# Patient Record
Sex: MALE | Race: WHITE
[De-identification: names, ages, dates, MRNs, and addresses within clinical notes are randomized per-mention and may not be internally consistent; named-entity substitution may affect disease eponyms.]

---

## 2018-04-18 ENCOUNTER — HOSPITAL ENCOUNTER (EMERGENCY)
Dept: HOSPITAL 17 - NEPC | Age: 68
Discharge: HOME | End: 2018-04-18
Payer: MEDICARE

## 2018-04-18 VITALS
SYSTOLIC BLOOD PRESSURE: 193 MMHG | TEMPERATURE: 97.9 F | HEART RATE: 60 BPM | OXYGEN SATURATION: 100 % | DIASTOLIC BLOOD PRESSURE: 81 MMHG | RESPIRATION RATE: 18 BRPM

## 2018-04-18 VITALS
DIASTOLIC BLOOD PRESSURE: 81 MMHG | SYSTOLIC BLOOD PRESSURE: 178 MMHG | OXYGEN SATURATION: 99 % | RESPIRATION RATE: 12 BRPM | HEART RATE: 55 BPM

## 2018-04-18 VITALS
RESPIRATION RATE: 15 BRPM | HEART RATE: 78 BPM | DIASTOLIC BLOOD PRESSURE: 82 MMHG | SYSTOLIC BLOOD PRESSURE: 164 MMHG | OXYGEN SATURATION: 98 %

## 2018-04-18 VITALS — WEIGHT: 253.53 LBS | BODY MASS INDEX: 33.6 KG/M2 | HEIGHT: 73 IN

## 2018-04-18 DIAGNOSIS — L08.9: ICD-10-CM

## 2018-04-18 DIAGNOSIS — H92.02: Primary | ICD-10-CM

## 2018-04-18 DIAGNOSIS — M54.2: ICD-10-CM

## 2018-04-18 LAB
BASOPHILS # BLD AUTO: 0 TH/MM3 (ref 0–0.2)
BASOPHILS NFR BLD: 0.2 % (ref 0–2)
BUN SERPL-MCNC: 20 MG/DL (ref 7–18)
CALCIUM SERPL-MCNC: 8.8 MG/DL (ref 8.5–10.1)
CHLORIDE SERPL-SCNC: 107 MEQ/L (ref 98–107)
CREAT SERPL-MCNC: 1.18 MG/DL (ref 0.6–1.3)
EOSINOPHIL # BLD: 0.1 TH/MM3 (ref 0–0.4)
EOSINOPHIL NFR BLD: 0.7 % (ref 0–4)
ERYTHROCYTE [DISTWIDTH] IN BLOOD BY AUTOMATED COUNT: 13.8 % (ref 11.6–17.2)
GFR SERPLBLD BASED ON 1.73 SQ M-ARVRAT: 61 ML/MIN (ref 89–?)
GLUCOSE SERPL-MCNC: 121 MG/DL (ref 74–106)
HCO3 BLD-SCNC: 31.3 MEQ/L (ref 21–32)
HCT VFR BLD CALC: 43.2 % (ref 39–51)
HGB BLD-MCNC: 14.5 GM/DL (ref 13–17)
INR PPP: 1 RATIO
LYMPHOCYTES # BLD AUTO: 1.4 TH/MM3 (ref 1–4.8)
LYMPHOCYTES NFR BLD AUTO: 10.1 % (ref 9–44)
MCH RBC QN AUTO: 30.5 PG (ref 27–34)
MCHC RBC AUTO-ENTMCNC: 33.6 % (ref 32–36)
MCV RBC AUTO: 90.5 FL (ref 80–100)
MONOCYTE #: 0.5 TH/MM3 (ref 0–0.9)
MONOCYTES NFR BLD: 3.8 % (ref 0–8)
NEUTROPHILS # BLD AUTO: 11.8 TH/MM3 (ref 1.8–7.7)
NEUTROPHILS NFR BLD AUTO: 85.2 % (ref 16–70)
PLATELET # BLD: 230 TH/MM3 (ref 150–450)
PMV BLD AUTO: 7.6 FL (ref 7–11)
PROTHROMBIN TIME: 10.2 SEC (ref 9.8–11.6)
RBC # BLD AUTO: 4.77 MIL/MM3 (ref 4.5–5.9)
SODIUM SERPL-SCNC: 143 MEQ/L (ref 136–145)
WBC # BLD AUTO: 13.8 TH/MM3 (ref 4–11)

## 2018-04-18 PROCEDURE — 70491 CT SOFT TISSUE NECK W/DYE: CPT

## 2018-04-18 PROCEDURE — 99284 EMERGENCY DEPT VISIT MOD MDM: CPT

## 2018-04-18 PROCEDURE — 80048 BASIC METABOLIC PNL TOTAL CA: CPT

## 2018-04-18 PROCEDURE — 85610 PROTHROMBIN TIME: CPT

## 2018-04-18 PROCEDURE — 85730 THROMBOPLASTIN TIME PARTIAL: CPT

## 2018-04-18 PROCEDURE — 85025 COMPLETE CBC W/AUTO DIFF WBC: CPT

## 2018-04-18 NOTE — PD
HPI


Chief Complaint:  ENT Complaint


Time Seen by Provider:  11:54


Travel History


International Travel<30 days:  No


Contact w/Intl Traveler<30days:  No


Traveled to known affect area:  No





History of Present Illness


HPI


68-year-old male complains of left ear pain left upper neck pain.  Patient 

states the symptoms started 2 weeks ago.  Patient states the pain is sharp pain 

collapse of left upper neck and left ear.  Patient denies any pain radiation.  

She denies any fever chills.  Patient denies any sore throat coughing 

congestion.  Patient was seen a local urgent care center and given prescription 

for Z-José and advised to use the nose spray.  Patient finished the medication 

without relief of the pain.  On a scale of 1-10 the pain is a 7.  Patient also 

complains of a draining lesion right upper back for the past several days.





PFSH


Past Medical History


Medical History:  Denies Significant Hx


Influenza Vaccination:  No





Past Surgical History


Surgical History:  No Previous Surgery





Social History


Alcohol Use:  Yes (WINE DAILY)


Tobacco Use:  No


Substance Use:  No





Allergies-Medications


(Allergen,Severity, Reaction):  


Coded Allergies:  


     No Known Allergies (Unverified , 4/18/18)


Reported Meds & Prescriptions





Reported Meds & Active Scripts


Active


Reported


Amoxicillin 500 Mg Cap 500 Mg PO BID








Review of Systems


General / Constitutional:  No: Fever


Eyes:  No: Visual changes


HENT:  Positive: Neck Pain, Earache, No: Headaches


Cardiovascular:  No: Chest Pain or Discomfort


Respiratory:  No: Shortness of Breath


Gastrointestinal:  No: Abdominal Pain


Genitourinary:  No: Dysuria


Musculoskeletal:  No: Pain


Skin:  No Rash


Neurologic:  No: Weakness


Psychiatric:  No: Depression


Endocrine:  No: Polydipsia


Hematologic/Lymphatic:  No: Easy Bruising





Physical Exam


Narrative


GENERAL: Well-nourished, well-developed patient.


SKIN: Focused skin assessment warm/dry.


HEAD: Normocephalic.


EYES: No scleral icterus. No injection or drainage.


TM: Clear.  Ear canals normal.


NECK: Supple, trachea midline. No JVD or lymphadenopathy.  Patient has mild 

tenderness palpation left anterior cervical area.  No meningismus noted.  No 

rash noted.


CARDIOVASCULAR: Regular rate and rhythm without murmurs, gallops, or rubs. 


RESPIRATORY: Breath sounds equal bilaterally. No accessory muscle use.


GASTROINTESTINAL: Abdomen soft, non-tender, nondistended. 


MUSCULOSKELETAL: No cyanosis, or edema. 


BACK: Nontender without obvious deformity. No CVA tenderness.  Patient has a 

pustule lesion on the right upper back with tenderness and redness associate 

with that.





Data


Data


Last Documented VS





Vital Signs








  Date Time  Temp Pulse Resp B/P (MAP) Pulse Ox O2 Delivery O2 Flow Rate FiO2


 


4/18/18 12:14  55 12 178/81 (113) 99 Room Air  


 


4/18/18 11:08 97.9       








Orders





 Orders


Complete Blood Count With Diff (4/18/18 12:29)


Basic Metabolic Panel (Bmp) (4/18/18 12:29)


Prothrombin Time / Inr (Pt) (4/18/18 12:29)


Act Partial Throm Time (Ptt) (4/18/18 12:29)


Iv Access Insert/Monitor (4/18/18 12:29)


Ct Soft Tiss Neck W Iv Cont (4/18/18 12:29)


Iohexol 350 Inj (Omnipaque 350 Inj) (4/18/18 15:15)





Labs





Laboratory Tests








Test


  4/18/18


12:16


 


White Blood Count 13.8 TH/MM3 


 


Red Blood Count 4.77 MIL/MM3 


 


Hemoglobin 14.5 GM/DL 


 


Hematocrit 43.2 % 


 


Mean Corpuscular Volume 90.5 FL 


 


Mean Corpuscular Hemoglobin 30.5 PG 


 


Mean Corpuscular Hemoglobin


Concent 33.6 % 


 


 


Red Cell Distribution Width 13.8 % 


 


Platelet Count 230 TH/MM3 


 


Mean Platelet Volume 7.6 FL 


 


Neutrophils (%) (Auto) 85.2 % 


 


Lymphocytes (%) (Auto) 10.1 % 


 


Monocytes (%) (Auto) 3.8 % 


 


Eosinophils (%) (Auto) 0.7 % 


 


Basophils (%) (Auto) 0.2 % 


 


Neutrophils # (Auto) 11.8 TH/MM3 


 


Lymphocytes # (Auto) 1.4 TH/MM3 


 


Monocytes # (Auto) 0.5 TH/MM3 


 


Eosinophils # (Auto) 0.1 TH/MM3 


 


Basophils # (Auto) 0.0 TH/MM3 


 


CBC Comment DIFF FINAL 


 


Differential Comment  


 


Prothrombin Time 10.2 SEC 


 


Prothromb Time International


Ratio 1.0 RATIO 


 


 


Activated Partial


Thromboplast Time 23.8 SEC 


 


 


Blood Urea Nitrogen 20 MG/DL 


 


Creatinine 1.18 MG/DL 


 


Random Glucose 121 MG/DL 


 


Calcium Level 8.8 MG/DL 


 


Sodium Level 143 MEQ/L 


 


Potassium Level 4.1 MEQ/L 


 


Chloride Level 107 MEQ/L 


 


Carbon Dioxide Level 31.3 MEQ/L 


 


Anion Gap 5 MEQ/L 


 


Estimat Glomerular Filtration


Rate 61 ML/MIN 


 











MDM


Medical Decision Making


Medical Screen Exam Complete:  Yes


Emergency Medical Condition:  Yes


Interpretation(s)


1338 PM.  CBC WBC 13.8.  85 neutrophil.  BMP with BUN of 20.


Differential Diagnosis


Differential diagnosis including neuralgia, lymphadenitis, abscess.


Narrative Course


68-year-old male with left upper neck pain left ear pain.  Examination reveals 

tenderness on palpation left upper neck area.





Diagnosis





 Primary Impression:  


 Otalgia, left ear


 Additional Impression:  


 Infected skin lesion


Patient Instructions:  General Instructions





***Additional Instructions:  


Clindamycin and Bactrim DS as directed.  Follow-up with ENT if persistent 

problem.  Return if worse.


***Med/Other Pt SpecificInfo:  Prescription(s) given


Scripts


Sulfamethoxazole-Trimethoprim (Bactrim DS) 800-160 Mg Tab


1 TAB PO BID for Infection, #14 TAB 0 Refills


   Prov: Isidro Kidd MD         4/18/18 


Clindamycin (Clindamycin) 150 Mg Cap


300 MG PO QID for Infection, #56 CAP 0 Refills


   Prov: Isidro Kidd MD         4/18/18


Disposition:  01 DISCHARGE HOME


Condition:  Stable











Isidro Kidd MD Apr 18, 2018 12:36

## 2018-04-18 NOTE — RADRPT
EXAM DATE/TIME:  04/18/2018 15:09 

 

[facility] COMPARISON:     

No previous studies available for comparison.

 

 

INDICATIONS :     

Left ear pain, dizzy vomiting

                      

 

IV CONTRAST:     

18.64 cc Omnipaque 350 (iohexol) IV 

                      

 

RADIATION DOSE:     

71 CTDIvol (mGy) 

 

 

MEDICAL HISTORY :     

None  

 

SURGICAL HISTORY :      

None. 

 

ENCOUNTER:      

Initial

 

ACUITY:      

1 day

 

PAIN SCALE:      

8/10

 

LOCATION:       

Left  ear

 

TECHNIQUE:     

Volumetric scanning of the neck was performed.  Using automated exposure control and adjustment of th
e mA and/or kV according to patient size, radiation dose was kept as low as reasonably achievable to 
obtain optimal diagnostic quality images.   DICOM format image data is available electronically for r
eview and comparison.  

 

FINDINGS:     

The right and left temporal bones are unremarkable.  There is no mastoiditis.

The nasopharynx and oropharynx unremarkable.  Chronic region appears normal.  The submandibular gland
s are 

unremarkable.  

There is no mass or adenopathy

Thyroid appears normal

Degenerative changes are present in the cervical spine

CONCLUSION:     I do not see etiology of patient's left ear pain.  MRI of the skull base male for mor
e information if symptoms persist.  I don't see evidence for skull base mass on the CT.  

 

 

 Arnoldo Tristan MD FACR on April 18, 2018 at 15:38           

Board Certified Radiologist.

 This report was verified electronically.